# Patient Record
Sex: MALE | Race: WHITE | NOT HISPANIC OR LATINO | URBAN - NONMETROPOLITAN AREA
[De-identification: names, ages, dates, MRNs, and addresses within clinical notes are randomized per-mention and may not be internally consistent; named-entity substitution may affect disease eponyms.]

---

## 2019-08-14 ENCOUNTER — IMPORTED ENCOUNTER (OUTPATIENT)
Dept: URBAN - NONMETROPOLITAN AREA CLINIC 1 | Facility: CLINIC | Age: 74
End: 2019-08-14

## 2019-08-14 PROBLEM — H02.831: Noted: 2019-08-14

## 2019-08-14 PROBLEM — H52.4: Noted: 2019-08-14

## 2019-08-14 PROBLEM — H43.813: Noted: 2019-08-14

## 2019-08-14 PROBLEM — H02.834: Noted: 2019-08-14

## 2019-08-14 PROBLEM — Z96.1: Noted: 2019-08-14

## 2019-08-14 PROCEDURE — 92015 DETERMINE REFRACTIVE STATE: CPT

## 2019-08-14 PROCEDURE — 92002 INTRM OPH EXAM NEW PATIENT: CPT

## 2019-08-14 NOTE — PATIENT DISCUSSION
Dermatochalasis - Discussed diagnosis in detail with patient- No superior field of vision loss- May consider lid eval with Dr. Cleo Munguia- Continue to monitorPseudophakia OU- Discussed diagnosis in detail with patient- Patient is stable and doing well- Continue to monitorPVD OU- Discussed findings of exam in detail with the patient. - The risk of retinal detachment in patients with PVDs was discussed with the patient and the warning signs of retinal detachment were carefully reviewed with the patient. - The patient was warned to return to the office or contact the ophthalmologist on call immediately if they experience signs of retinal detachment. - Continue to monitor Astigmatism / Hyperopia / Presbyopia - Discussed diagnosis in detail with patient - New glasses Rx given today- Continue to monitor- RTC 1 year complete

## 2022-04-09 ASSESSMENT — TONOMETRY
OS_IOP_MMHG: 12
OD_IOP_MMHG: 12

## 2022-04-09 ASSESSMENT — VISUAL ACUITY
OS_CC: 20/25-2
OD_CC: 20/30